# Patient Record
Sex: MALE | Race: WHITE
[De-identification: names, ages, dates, MRNs, and addresses within clinical notes are randomized per-mention and may not be internally consistent; named-entity substitution may affect disease eponyms.]

---

## 2017-12-05 ENCOUNTER — HOSPITAL ENCOUNTER (EMERGENCY)
Dept: HOSPITAL 62 - ER | Age: 32
Discharge: HOME | End: 2017-12-05
Payer: OTHER GOVERNMENT

## 2017-12-05 VITALS — SYSTOLIC BLOOD PRESSURE: 147 MMHG | DIASTOLIC BLOOD PRESSURE: 97 MMHG

## 2017-12-05 DIAGNOSIS — R06.02: ICD-10-CM

## 2017-12-05 DIAGNOSIS — R61: ICD-10-CM

## 2017-12-05 DIAGNOSIS — R07.9: Primary | ICD-10-CM

## 2017-12-05 LAB
ALBUMIN SERPL-MCNC: 5 G/DL (ref 3.5–5)
ALP SERPL-CCNC: 71 U/L (ref 38–126)
ALT SERPL-CCNC: 51 U/L (ref 21–72)
ANION GAP SERPL CALC-SCNC: 15 MMOL/L (ref 5–19)
AST SERPL-CCNC: 27 U/L (ref 17–59)
BASOPHILS # BLD AUTO: 0.1 10^3/UL (ref 0–0.2)
BASOPHILS NFR BLD AUTO: 0.7 % (ref 0–2)
BILIRUB DIRECT SERPL-MCNC: 0.3 MG/DL (ref 0–0.4)
BILIRUB SERPL-MCNC: 0.5 MG/DL (ref 0.2–1.3)
BUN SERPL-MCNC: 15 MG/DL (ref 7–20)
CALCIUM: 9.5 MG/DL (ref 8.4–10.2)
CHLORIDE SERPL-SCNC: 103 MMOL/L (ref 98–107)
CO2 SERPL-SCNC: 28 MMOL/L (ref 22–30)
CREAT SERPL-MCNC: 1.19 MG/DL (ref 0.52–1.25)
EOSINOPHIL # BLD AUTO: 0.5 10^3/UL (ref 0–0.6)
EOSINOPHIL NFR BLD AUTO: 4.7 % (ref 0–6)
ERYTHROCYTE [DISTWIDTH] IN BLOOD BY AUTOMATED COUNT: 13.4 % (ref 11.5–14)
GLUCOSE SERPL-MCNC: 80 MG/DL (ref 75–110)
HCT VFR BLD CALC: 47.4 % (ref 37.9–51)
HGB BLD-MCNC: 16.2 G/DL (ref 13.5–17)
HGB HCT DIFFERENCE: 1.2
LYMPHOCYTES # BLD AUTO: 4.1 10^3/UL (ref 0.5–4.7)
LYMPHOCYTES NFR BLD AUTO: 37.8 % (ref 13–45)
MCH RBC QN AUTO: 29.4 PG (ref 27–33.4)
MCHC RBC AUTO-ENTMCNC: 34.3 G/DL (ref 32–36)
MCV RBC AUTO: 86 FL (ref 80–97)
MONOCYTES # BLD AUTO: 0.6 10^3/UL (ref 0.1–1.4)
MONOCYTES NFR BLD AUTO: 5.4 % (ref 3–13)
NEUTROPHILS # BLD AUTO: 5.5 10^3/UL (ref 1.7–8.2)
NEUTS SEG NFR BLD AUTO: 51.4 % (ref 42–78)
POTASSIUM SERPL-SCNC: 4.3 MMOL/L (ref 3.6–5)
PROT SERPL-MCNC: 7.8 G/DL (ref 6.3–8.2)
RBC # BLD AUTO: 5.52 10^6/UL (ref 4.35–5.55)
SODIUM SERPL-SCNC: 146.2 MMOL/L (ref 137–145)
WBC # BLD AUTO: 10.8 10^3/UL (ref 4–10.5)

## 2017-12-05 PROCEDURE — 93010 ELECTROCARDIOGRAM REPORT: CPT

## 2017-12-05 PROCEDURE — 99285 EMERGENCY DEPT VISIT HI MDM: CPT

## 2017-12-05 PROCEDURE — 84484 ASSAY OF TROPONIN QUANT: CPT

## 2017-12-05 PROCEDURE — 93005 ELECTROCARDIOGRAM TRACING: CPT

## 2017-12-05 PROCEDURE — 36415 COLL VENOUS BLD VENIPUNCTURE: CPT

## 2017-12-05 PROCEDURE — 71020: CPT

## 2017-12-05 PROCEDURE — 80053 COMPREHEN METABOLIC PANEL: CPT

## 2017-12-05 PROCEDURE — 85025 COMPLETE CBC W/AUTO DIFF WBC: CPT

## 2017-12-05 NOTE — ER DOCUMENT REPORT
ED Medical Screen (RME)





- General


Chief Complaint: Chest Pain > 30


Stated Complaint: CHEST PAIN


Time Seen by Provider: 12/05/17 15:42


Notes: 





cp for two days, right sided.


TRAVEL OUTSIDE OF THE [place] IN LAST 30 DAYS: No





- Related Data


Allergies/Adverse Reactions: 


 





No Known Allergies Allergy (Unverified 12/05/17 14:58)


 











Past Medical History





- Social History


Chew tobacco use (# tins/day): No


Frequency of alcohol use: Occasional


Drug Abuse: None


Renal/ Medical History: Denies: Hx Peritoneal Dialysis





Physical Exam





- Vital signs


Vitals: 





 











Temp Pulse Resp BP Pulse Ox


 


 98.7 F   89   20   142/77 H  97 


 


 12/05/17 14:41  12/05/17 14:41  12/05/17 14:41  12/05/17 14:41  12/05/17 14:41














Course





- Vital Signs


Vital signs: 





 











Temp Pulse Resp BP Pulse Ox


 


 98.7 F   89   20   142/77 H  97 


 


 12/05/17 14:41  12/05/17 14:41  12/05/17 14:41  12/05/17 14:41  12/05/17 14:41

## 2017-12-05 NOTE — ER DOCUMENT REPORT
ED Cardiac





- General


Chief Complaint: Chest Pain > 30


Stated Complaint: CHEST PAIN


Time Seen by Provider: 12/05/17 15:42


Mode of Arrival: Ambulatory


Information source: Patient


Notes: 





Patient complains of right-sided chest pain that he states that approximately 3 

days ago.  He states it is been intermittent.  He does not know of anything 

that makes it better or worse.  It does occasionally radiate from the right 

chest to his right arm.  He has had some mild shortness of breath and sweating.

  No nausea vomiting or diarrhea.  No fevers or trauma.  No previous history of 

cardiac disease.  No history of any type of cardiac evaluation.  He denies any 

family history.  He does not smoke or have any chronic medical conditions.  The 

pain is been moderate.  Is been an aching sensation.


TRAVEL OUTSIDE OF THE [place] IN LAST 30 DAYS: No





- Related Data


Allergies/Adverse Reactions: 


 





No Known Allergies Allergy (Unverified 12/05/17 14:58)


 











Past Medical History





- General


Information source: Patient





- Social History


Smoking Status: Never Smoker


Chew tobacco use (# tins/day): No


Frequency of alcohol use: Occasional


Drug Abuse: None


Family History: None


Patient has suicidal ideation: No


Patient has homicidal ideation: No


Renal/ Medical History: Denies: Hx Peritoneal Dialysis





Review of Systems





- Review of Systems


Constitutional: denies: Chills, Fever


Cardiovascular: Chest pain.  denies: Palpitations


Respiratory: Short of breath.  denies: Cough


Gastrointestinal: denies: Diarrhea, Vomiting


-: Yes All other systems reviewed and negative





Physical Exam





- Vital signs


Vitals: 





 











Temp Pulse Resp BP Pulse Ox


 


 98.7 F   89   20   142/77 H  97 


 


 12/05/17 14:41  12/05/17 14:41  12/05/17 14:41  12/05/17 14:41  12/05/17 14:41











Interpretation: Normal





- General


General appearance: Appears well, Alert





- HEENT


Head: Normocephalic, Atraumatic


Eyes: Normal


Pupils: PERRL





- Respiratory


Respiratory status: No respiratory distress


Chest status: Nontender


Breath sounds: Normal


Chest palpation: Normal





- Cardiovascular


Rhythm: Regular


Heart sounds: Normal auscultation


Murmur: No





- Abdominal


Inspection: Normal


Distension: No distension


Bowel sounds: Normal


Tenderness: Nontender


Organomegaly: No organomegaly





- Back


Back: Normal, Nontender





- Extremities


General upper extremity: Normal inspection, Nontender, Normal color, Normal ROM

, Normal temperature


General lower extremity: Normal inspection, Nontender, Normal color, Normal ROM

, Normal temperature, Normal weight bearing.  No: Romelia's sign





- Neurological


Neuro grossly intact: Yes


Cognition: Normal


Orientation: AAOx4


[place] Coma Scale Eye Opening: Spontaneous


[place] Coma Scale Verbal: Oriented


[place] Coma Scale Motor: Obeys Commands


[place] Coma Scale Total: 15


Speech: Normal


Motor strength normal: LUE, RUE, LLE, RLE


Sensory: Normal





- Psychological


Associated symptoms: Normal affect, Normal mood





- Skin


Skin Temperature: Warm


Skin Moisture: Dry


Skin Color: Normal





Course





- Vital Signs


Vital signs: 





 











Temp Pulse Resp BP Pulse Ox


 


 98.7 F   89   20   142/77 H  97 


 


 12/05/17 14:41  12/05/17 14:41  12/05/17 14:41  12/05/17 14:41  12/05/17 14:41














- Laboratory


Result Diagrams: 


 12/05/17 15:52





 12/05/17 15:52


Laboratory results interpreted by me: 





 











  12/05/17 12/05/17





  15:52 15:52


 


WBC  10.8 H 


 


Sodium   146.2 H














- Diagnostic Test


Radiology reviewed: Image reviewed, Reports reviewed - Chest x-ray shows no 

evidence of infiltrate or edema





- EKG Interpretation by Me


EKG shows normal: Sinus rhythm


Rate: Normal


Rhythm: NSR


Axis/QRS: No: Right axis deviation, Left axis deviation





Discharge





- Discharge


Clinical Impression: 


Chest pain


Qualifiers:


 Chest pain type: unspecified Qualified Code(s): R07.9 - Chest pain, unspecified





Condition: Stable


Disposition: HOME, SELF-CARE


Instructions:  Chest Pain of Unclear Cause ([facility])


Additional Instructions: 


Your blood pressure is elevated.  Please have this checked within 1 week by 

your doctor.


Forms:  Elevated Blood Pressure, Return to Work


Referrals: 


MIMI YANEZ MD [COMMUNITY BASED STAFF] - Follow up in 1 week

## 2017-12-05 NOTE — RADIOLOGY REPORT (SQ)
EXAM DESCRIPTION:  CHEST PA/LAT



COMPLETED DATE/TIME:  12/5/2017 4:12 pm



REASON FOR STUDY:  cp



COMPARISON:  None.



NUMBER OF VIEWS:  Two view.



TECHNIQUE:  Frontal and lateral radiographic views of the chest acquired.



LIMITATIONS:  None.



FINDINGS:  LUNGS AND PLEURA:  No opacities, masses or pneumothorax. No pleural effusion.

MEDIASTINUM AND HILAR STRUCTURES:  No masses or contour abnormalities.

HEART AND VASCULATURE:  Heart normal size.  No evidence for failure.

BONY STRUCTURES:  No acute findings.

HARDWARE:  None.

OTHER:  No other significant finding.



IMPRESSION:  NO SIGNIFICANT RADIOGRAPHIC FINDING IN THE CHEST.



TECHNICAL DOCUMENTATION:  JOB ID:  [id number]

 2011 [employer]- All Rights Reserved

## 2018-01-08 ENCOUNTER — HOSPITAL ENCOUNTER (EMERGENCY)
Dept: HOSPITAL 62 - ER | Age: 33
Discharge: HOME | End: 2018-01-08
Payer: OTHER GOVERNMENT

## 2018-01-08 VITALS — SYSTOLIC BLOOD PRESSURE: 143 MMHG | DIASTOLIC BLOOD PRESSURE: 91 MMHG

## 2018-01-08 DIAGNOSIS — R20.0: ICD-10-CM

## 2018-01-08 DIAGNOSIS — S16.1XXA: Primary | ICD-10-CM

## 2018-01-08 DIAGNOSIS — M54.89: ICD-10-CM

## 2018-01-08 DIAGNOSIS — R20.2: ICD-10-CM
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
## 2018-01-08 PROCEDURE — 99283 EMERGENCY DEPT VISIT LOW MDM: CPT

## 2018-01-08 PROCEDURE — 72050 X-RAY EXAM NECK SPINE 4/5VWS: CPT

## 2018-01-08 PROCEDURE — 96374 THER/PROPH/DIAG INJ IV PUSH: CPT

## 2018-01-08 PROCEDURE — 96372 THER/PROPH/DIAG INJ SC/IM: CPT

## 2018-01-08 NOTE — RADIOLOGY REPORT (SQ)
EXAM DESCRIPTION:  CERV SP 4 OR 5 VIEWS



COMPLETED DATE/TIME:  1/8/2018 12:38 pm



REASON FOR STUDY:  neck pain numbness tingling down right arm



COMPARISON:  None.



NUMBER OF VIEWS:  Five views.



TECHNIQUE:  AP, lateral, obliques and odontoid radiographic images acquired of the cervical spine.



LIMITATIONS:  None.



FINDINGS:  MINERALIZATION: Normal.

ALIGNMENT: Anatomic.

VERTEBRAE: Vertebral bodies of normal height.

DISCS: No significant osteophytes or sclerosis. Disc height maintained.

FORAMINA: No osteophytes or foraminal narrowing.

LATERAL AND POSTERIOR ELEMENTS: Facets, lateral masses and spinous processes without significant find
ings.

HARDWARE: None in the spine.

SOFT TISSUES: No masses or calcifications. Lung apices clear.

OTHER: No other significant finding.



IMPRESSION:  NO SIGNIFICANT RADIOGRAPHIC FINDING IN THE CERVICAL SPINE.



TECHNICAL DOCUMENTATION:  JOB ID:  [id number]

 2011 [employer]- All Rights Reserved

## 2018-01-08 NOTE — ER DOCUMENT REPORT
ED Neck/Back Problem





- General


Chief Complaint: Neck and Upper Back Pain


Stated Complaint: NECK/SHOULDER PAIN


Time Seen by Provider: 01/08/18 11:50


Mode of Arrival: Ambulatory


Information source: Patient


Notes: 





32-year-old male presents to ED for complaint of neck and shoulder pain 

radiating down his right arm.  Patient states he came in in December and now 

the pain is worse with numbness and tingling down his right arm.  He has not 

followed up as he was instructed.  He does have a history of sciatica with 

bulging disc at S1.  He is a .  He states he was seen here 

in December for what he felt was chest pain but it was actually the same pain 

he is having now which is pain in the upper back to neck around both sides of 

the neck and down the right arm.


TRAVEL OUTSIDE OF THE [place] IN LAST 30 DAYS: No





- HPI


Patient complains to provider of: Neck, Upper back


Onset: Other - Since December


Onset: Gradual


Timing: Still present


Quality of pain: Sharp


Severity: Severe


Pain Level: 5


Context: Lifting


Recent injury: No


Associated symptoms: Like prior neck/back pain, Numbness/tingling, Radiation to 

arm, Upper back pain.  denies: Constipation, Fever, Incontinence, Motor loss, 

Sensory loss, Sweaty, Unable to urinate, Lower back pain


Exacerbated by: Movement of neck


Relieved by: Nothing


Similar symptoms previously: Yes


Recently seen / treated by doctor: Yes





- Related Data


Allergies/Adverse Reactions: 


 





No Known Allergies Allergy (Verified 01/08/18 10:56)


 











Past Medical History





- General


Information source: Patient





- Social History


Smoking Status: Never Smoker


Cigarette use (# per day): No


Chew tobacco use (# tins/day): No


Smoking Education Provided: No


Frequency of alcohol use: Social - 2 times a week


Drug Abuse: None


Occupation:  at the present


Lives with: Family


Family History: Malignancy.  denies: Arthritis, CAD, COPD, CVA, DM, 

Hyperlipidemia, Hypertension, Thyroid Disfunction


Patient has suicidal ideation: No


Patient has homicidal ideation: No





- Past Medical History


Cardiac Medical History: Reports: None


Pulmonary Medical History: Reports: None


EENT Medical History: Reports: None


Neurological Medical History: Reports: None


Endocrine Medical History: Reports: None


Renal/ Medical History: Reports: None


Malignancy Medical History: Reports None


GI Medical History: Reports: None


Musculoskeltal Medical History: Reports Hx Arthritis, Reports Hx 

Musculoskeletal Deformity


Skin Medical History: Reports None


Psychiatric Medical History: Reports: None


Traumatic Medical History: Reports: None


Infectious Medical History: Reports: None


Surgical Hx: Negative


Past Surgical History: Reports: None





Review of Systems





- Review of Systems


Constitutional: No symptoms reported


EENT: No symptoms reported


Cardiovascular: No symptoms reported


Respiratory: No symptoms reported


Gastrointestinal: No symptoms reported


Genitourinary: No symptoms reported


Male Genitourinary: No symptoms reported


Musculoskeletal: Muscle pain, Muscle stiffness, Neck pain


Skin: No symptoms reported


Hematologic/Lymphatic: No symptoms reported


Neurological/Psychological: No symptoms reported


-: Yes All other systems reviewed and negative





Physical Exam





- Vital signs


Vitals: 


 











Temp Pulse Resp BP Pulse Ox


 


 97.8 F   99   18   147/86 H  96 


 


 01/08/18 10:59  01/08/18 10:59  01/08/18 10:59  01/08/18 10:59  01/08/18 10:59











Interpretation: Normal





- General


General appearance: Appears well, Alert





- HEENT


Head: Normocephalic, Atraumatic


Eyes: Normal


Pupils: PERRL





- Respiratory


Respiratory status: No respiratory distress


Chest status: Nontender


Breath sounds: Normal


Chest palpation: Normal





- Cardiovascular


Rhythm: Regular


Heart sounds: Normal auscultation


Murmur: No





- Abdominal


Inspection: Normal


Distension: No distension


Bowel sounds: Normal


Tenderness: Nontender


Organomegaly: No organomegaly





- Back


Back: Normal, Tender, Vertebra tenderness.  No: Deformity/step-off, CVA 

tenderness, Scars, Scoliosis, Wounds





- Extremities


General upper extremity: Normal inspection, Nontender, Normal color, Normal ROM

, Normal temperature


General lower extremity: Normal inspection, Nontender, Normal color, Normal ROM

, Normal temperature, Normal weight bearing.  No: Romelia's sign





- Neurological


Neuro grossly intact: Yes


Cognition: Normal


Orientation: AAOx4


[place] Coma Scale Eye Opening: Spontaneous


[place] Coma Scale Verbal: Oriented


[place] Coma Scale Motor: Obeys Commands


[place] Coma Scale Total: 15


Speech: Normal


Motor strength normal: LUE, RUE, LLE, RLE


Sensory: Normal





- Psychological


Associated symptoms: Normal affect, Normal mood





- Skin


Skin Temperature: Warm


Skin Moisture: Dry


Skin Color: Normal





Course





- Re-evaluation


Re-evalutation: 





01/08/18 21:27


Patient has full range of motion to both arms and shoulders.  There is no 

increase in pain with range of motion of the arms and shoulders.  He has equal 

strength in bilateral arms.  His x-ray of the cervical spine was discussed with 

patient and a written report given to patient with instructions to follow-up 

with a primary doctor and with a spine specialist.  Patient was treated with 

Toradol Decadron and Lidoderm for his pain.





- Vital Signs


Vital signs: 


 











Temp Pulse Resp BP Pulse Ox


 


 98.7 F   86   16   143/91 H  97 


 


 01/08/18 13:27  01/08/18 13:27  01/08/18 13:27  01/08/18 13:27  01/08/18 13:27














Discharge





- Discharge


Clinical Impression: 


 Upper back pain





Cervical strain


Qualifiers:


 Encounter type: initial encounter Qualified Code(s): S16.1XXA - Strain of 

muscle, fascia and tendon at neck level, initial encounter





Condition: Stable


Disposition: HOME, SELF-CARE


Instructions:  Family Physicians / Practices


Additional Instructions: 


NECK INJURY (CERVICAL STRAIN):


     You have a neck strain.  This is an injury to the muscles and ligaments in 

the neck.  There is no evidence of a fracture of the neck bones.  Also, no 

injury to the spinal cord or nerve roots was detected.


     Usually, stiffness and pain INCREASE for the first 24-48 hours after the 

injury.  The pain will gradually resolve and the neck will become more mobile.  

Most patients are back at work or school within a few days.  Typically, 

complete healing takes about two or three weeks.


     The usual initial treatment is rest and cold packs.  A neck collar may be 

placed to keep the muscles of the neck at rest. Antiinflammatory and muscle 

relaxing medication are often used to reduce the spasm and irritation.


     You should call the doctor, or go to the hospital, if you develop numbness 

or weakness in any extremity, problems with your bladder or bowel, or pain 

radiating down the arms.








MUSCLE STRAIN:


     You have strained a muscle -- torn the fibers within the muscle. This 

often occurs with strenuous exertion, or during an injury that suddenly 

stretches the muscle.  The seriousness of a strain varies. Some strains heal 

within days, others cause problems for months.


     X-rays cannot show a muscle strain.  X-rays are taken only if symptoms 

suggest that a fracture could be present.


     The usual treatment of a muscle strain is rest and ice packs. Sometimes, a 

sling, splint, or crutches may be necessary to rest the muscle.  The muscle can 

be used again once pain subsides.  Severe strains require a special exercise 

and stretching program to prevent permanent stiffness and disability.  Your 

doctor will advise you if this will be necessary.


     Call the doctor immediately if pain or swelling becomes severe, or if 

numbness or discoloration develop.








USE OF TYLENOL (ACETAMINOPHEN):


     Acetaminophen may be taken for pain relief or fever control. It's much 

safer than aspirin, offering a wider range of "safe" dosages.  It is safe 

during pregnancy.  Some brand names are Tylenol, Panadol, Datril, Anacin 3, 

Tempra, and Liquiprin. Acetaminophen can be repeated every four hours.  The 

following are maximum recommended dosages:





WEIGHT         Dose             Drops                  Elixir        Chewable(

80mg)


(LBS.)                            drprs=droppers    tsp=teaspoon


6               40 mg            0.4 ml (1/2)


6-11            80 mg            0.8 ml (full)              tsp               

   1       tab


12-16         120 mg           1 1/2 drprs             3/4  tsp               1 

1/2  tabs


17-23         160 mg             2  drprs             1    tsp                 

  2       tabs


24-30         240 mg             3  drprs             1 1/2 tsp                

3       tabs


30-35         320 mg                                       2    tsp            

       4       tabs


36-41         360 mg                                       2 1/4   tsp         

     4 1/2 tabs


42-47         400 mg                                       2 1/2   tsp         

     5      tabs


48-53         480 mg                                       3    tsp            

       6      tabs


54-59         520 mg                                       3  1/4  tsp         

     6 1/2 tabs


60-64         560 mg                                       3  1/2  tsp         

     7      tabs 


65-70         600 mg                                       3  3/4  tsp         

     7 1/2 tabs


71-76         640 mg                                       4   tsp             

      8      tabs


77-82         720 mg                                       4 1/2   tsp         

    9      tabs


83-88         800 mg                                       5   tsp             

    10      tabs





>89 pounds or adults          650 mg to 900 mg





Acetaminophen can be repeated every four hours.  Maximum dose not to exceed 

4000 mg a day.





   These maximum recommended dosages are slightly higher than the dosages 

written on the product container, but these dosages are very safe and below the 

toxic dosage for acetaminophen.








ICE PACKS:


     Apply ice packs frequently against the painful area.  Many different 

schedules are recommended, such as "20 minutes on, 20 minutes off" or "one hour 

ice, two hours rest."  If you need to work, you may need to go longer between 

ice treatments.  You should plan to have the area ice packed AT LEAST one 

fourth of the time.


     The ice should be applied over the wrap, tape, or splint, or over a layer 

of cloth -- not directly against the skin.  Some ice bags have a built-in cloth 

and can be put directly on the skin.





WARM PACKS:


     After approximately two days, apply gentle heat (such as a heating pad or 

hot water bottle) for about 20 to 30 minutes about every two hours -- at least 

four times daily.  Warmth and elevation will help you make a more rapid recovery

, and will ease the pain considerably.


     Do not use HOT heat, and never apply heat for longer than 30 minutes.  The 

continuous heat can invisibly damage skin and muscles -- even when no burn is 

seen on the surface.  Damaged muscles can make you MORE sore.








MUSCLE RELAXERS: 


     Muscle relaxing medications are usually prescribed for acute muscle spasm 

or injury to the neck and back.  They are often combined with antiinflammatory 

pain medication for increased relief.


     You may stop the muscle relaxer when the pain and stiffness have improved.

  Start the medication again if spasms recur.  


     Muscle relaxers may cause drowsiness, especially with the first dose.  Do 

not operate machinery or drive while under the effects of the medication.  Most 

muscle relaxers last up to 24 hours.  Do not combine the medication with 

alcohol.





Ibuprofen





     Ibuprofen is an excellent, safe drug for pain control.  In addition, it 

has potent antiinflammatory effects which are beneficial, especially in the 

treatment of injuries, arthritis, or tendonitis. It's best to take ibuprofen 

with food.  Persons with ulcer disease or allergy to aspirin should notify 

their physician of this before taking ibuprofen.


     Take the medication exactly as prescribed.  Don't take additional doses 

unless instructed to do so by your doctor.  If you develop wheezing, shortness 

of breath, hives, faintness, stomach pain, vomiting, or dark black stools, 

return for re-evaluation at once.





Exercise Program for the Shoulder





     Since the shoulder moves in so many directions, the joint attachment is 

weak.  Muscles provide most of the stability to the shoulder.  You must 

exercise your shoulder to prevent painful instability or stiffening.


     PASSIVE - These may be begun within a few days of the injury. While 

standing, lean forward, allowing the arm to hang down towards the floor.  Move 

the arm in small circles while slowly twisting your chest towards and away from 

the hanging arm.  Do this for one minute.


     ACTIVE - These may be performed when the doctor gives permission. Begin 

with the arms at the sides.  Raise the arms forward (shoulder's width apart) 

until they reach shoulder level.  Then slowly swing both arms back until they 

are aiming straight out away from each other. Then bring them forward again, 

and finally, lower them to your sides. Repeat 20 to 30 times.  As you improve, 

put weights in your hands for the exercise.  Start with one pound, and work up 

to 10 pounds.  Never use more than is comfortable.  Athletes may work up to 30 

pounds.





FOLLOW-UP CARE:


If you have been referred to a physician for follow-up care, call the physician

s office for an appointment as you were instructed or within the next two days.

  If you experience worsening or a significant change in your symptoms, notify 

the physician immediately or return to the Emergency Department at any time for 

re-evaluation.


Prescriptions: 


Ibuprofen 800 mg PO Q8HP PRN #20 tablet


 PRN Reason: 


Cyclobenzaprine HCl [Flexeril 10 mg Tablet] 10 mg PO TIDP PRN #15 tab


 PRN Reason: 


Lidocaine [Lidoderm 5% (700 mg) Transdermal Patch] 1 patch TP DAILY #20 

adh..patch


Forms:  Elevated Blood Pressure, Return to Work


Referrals: 


RICHARDSON SYLVESTER MD [ASSOCIATE] - Follow up as needed

## 2018-01-14 ENCOUNTER — HOSPITAL ENCOUNTER (EMERGENCY)
Dept: HOSPITAL 62 - ER | Age: 33
Discharge: HOME | End: 2018-01-14
Payer: OTHER GOVERNMENT

## 2018-01-14 VITALS — SYSTOLIC BLOOD PRESSURE: 138 MMHG | DIASTOLIC BLOOD PRESSURE: 89 MMHG

## 2018-01-14 DIAGNOSIS — Z87.891: ICD-10-CM
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
## 2018-01-14 DIAGNOSIS — S16.1XXA: Primary | ICD-10-CM

## 2018-01-14 DIAGNOSIS — M25.519: ICD-10-CM

## 2018-01-14 DIAGNOSIS — I10: ICD-10-CM

## 2018-01-14 PROCEDURE — 99283 EMERGENCY DEPT VISIT LOW MDM: CPT

## 2018-01-14 NOTE — ER DOCUMENT REPORT
HPI





- HPI


Pain Level: 5





Past Medical History





- Social History


Family History: Malignancy.  denies: Arthritis, CAD, COPD, CVA, DM, 

Hyperlipidemia, Hypertension, Thyroid Disfunction


Renal/ Medical History: Denies: Hx Peritoneal Dialysis


Musculoskeltal Medical History: Reports Hx Arthritis, Reports Hx 

Musculoskeletal Deformity





Vertical Provider Document





- INFECTION CONTROL


TRAVEL OUTSIDE OF THE [place] IN LAST 30 DAYS: No





- RESPIRATORY


O2 Sat by Pulse Oximetry: 97





Course





- Vital Signs


Vital signs: 


 











Temp Pulse Resp BP Pulse Ox


 


 97.8 F   97   18   138/89 H  97 


 


 01/14/18 08:38  01/14/18 08:38  01/14/18 08:38  01/14/18 08:38  01/14/18 08:38

## 2018-01-14 NOTE — ER DOCUMENT REPORT
HPI





- HPI


Patient complains to provider of: neck pain


Onset: Other - Chronic


Onset/Duration: Intermittent


Severity: Moderate


Pain Level: 3


Context: 





32-year-old male presented to ED for complaint of neck and shoulder pain is 

been ongoing from an old injury.  He states that he needs a work note so that 

he can go back to work on Monday because his doctor's office was closed for the 

weekend.  He states he has a scheduled appointment on Wednesday with the back 

specialist for his neck and shoulder pain


Associated Symptoms: Other - Chronic neck and shoulder pain


Exacerbated by: Movement


Relieved by: Denies


Similar symptoms previously: Yes


Recently seen / treated by doctor: Yes





- ROS


ROS below otherwise negative: Yes





- CONSTITUTIONAL


Constitutional: DENIES: Fever, Chills





- EENT


EENT: DENIES: Sore Throat, Ear Pain, Nasal Drainage-Clear, Nasal Drainage-

Purulent, Congestion, Eye problems





- NEURO


Neurology: DENIES: Headache, Weakness, Vision blurred, Dizzinesss / Vertigo





- CARDIOVASCULAR


Cardiovascular: DENIES: Chest pain





- RESPIRATORY


Respiratory: DENIES: Trouble Breathing, Coughing





- GASTROINTESTINAL


Gastrointestinal: DENIES: Abdominal Pain, Nausea, Patient vomiting, Diarrhea, 

Constipation, Black / Bloody Stools





- URINARY


Urinary: DENIES: Dysuria, Urgency, Frequency





- REPRODUCTIVE


Reproductive: DENIES: Pregnant:, Postmenopausal, Abnormal bleeding / discharge





- MUSCULOSKELETAL


Musculoskeletal: REPORTS: Back Pain, Neck Pain.  DENIES: Extremity pain, 

Swelling





- DERM


Skin Color: Normal


Skin Problems: None





Past Medical History





- General


Information source: Patient





- Social History


Smoking Status: Former Smoker


Cigarette use (# per day): No


Chew tobacco use (# tins/day): No


Smoking Education Provided: No


Frequency of alcohol use: Occasional


Drug Abuse: None


Occupation: 


Lives with: Family


Family History: Malignancy.  denies: Arthritis, CAD, COPD, CVA, DM, 

Hyperlipidemia, Hypertension, Thyroid Disfunction


Patient has suicidal ideation: No


Patient has homicidal ideation: No





- Past Medical History


Cardiac Medical History: Reports: None


Pulmonary Medical History: Reports: None


EENT Medical History: Reports: None


Neurological Medical History: Reports: None


Endocrine Medical History: Reports: None


Renal/ Medical History: Reports: None


Malignancy Medical History: Reports None


GI Medical History: Reports: None


Musculoskeltal Medical History: Reports Hx Arthritis, Reports Hx 

Musculoskeletal Deformity


Skin Medical History: Reports None


Psychiatric Medical History: Reports: None


Traumatic Medical History: Reports: None


Infectious Medical History: Reports: None


Surgical Hx: Negative


Past Surgical History: Reports: None





Vertical Provider Document





- CONSTITUTIONAL


Agree With Documented VS: Yes


Exam Limitations: No Limitations


General Appearance: WD/WN, No Apparent Distress





- INFECTION CONTROL


TRAVEL OUTSIDE OF THE [place] IN LAST 30 DAYS: No





- HEENT


HEENT: Atraumatic, Normal ENT Exam, Normocephalic, PERRLA





- NECK


Neck: Normal Inspection, Supple, Thyroid Normal





- RESPIRATORY


Respiratory: Breath Sounds Normal, No Respiratory Distress, Chest Non-Tender


O2 Sat by Pulse Oximetry: 97





- CARDIOVASCULAR


Cardiovascular: Regular Rate, Regular Rhythm





- BACK


Back: negative: Normal Inspection - Patient complains of pain to the neck and 

shoulder upper back, CVA Tenderness-Right, CVA Tenderness-Left





- MUSCULOSKELETAL/EXTREMETIES


Musculoskeletal/Extremeties: Tender - Neck and upper back





- NEURO


Level of Consciousness: Awake, Alert, Appropriate


Motor/Sensory: No Motor Deficit, No Sensory Deficit





- DERM


Integumentary: Warm, Dry, No Rash





Course





- Re-evaluation


Re-evalutation: 





01/14/18 21:25


Patient has no change in condition since 1/8/2018 he has an appointment on 

Wednesday with the back specialist he stated he mostly just needed a work note 

for today.





- Vital Signs


Vital signs: 


 











Temp Pulse Resp BP Pulse Ox


 


 97.8 F   97   18   138/89 H  97 


 


 01/14/18 08:38  01/14/18 08:38  01/14/18 08:38  01/14/18 08:38  01/14/18 08:54














Discharge





- Discharge


Clinical Impression: 


 Upper back pain





Cervical strain


Qualifiers:


 Encounter type: initial encounter Qualified Code(s): S16.1XXA - Strain of 

muscle, fascia and tendon at neck level, initial encounter





HTN (hypertension)


Qualifiers:


 Hypertension type: unspecified Qualified Code(s): I10 - Essential (primary) 

hypertension





Condition: Stable


Disposition: HOME, SELF-CARE


Instructions:  Exercise Program for the Shoulder ([facility])


Additional Instructions: 


NECK INJURY (CERVICAL STRAIN):


     You have a neck strain.  This is an injury to the muscles and ligaments in 

the neck.  There is no evidence of a fracture of the neck bones.  Also, no 

injury to the spinal cord or nerve roots was detected.


     Usually, stiffness and pain INCREASE for the first 24-48 hours after the 

injury.  The pain will gradually resolve and the neck will become more mobile.  

Most patients are back at work or school within a few days.  Typically, 

complete healing takes about two or three weeks.


     The usual initial treatment is rest and cold packs.  A neck collar may be 

placed to keep the muscles of the neck at rest. Antiinflammatory and muscle 

relaxing medication are often used to reduce the spasm and irritation.


     You should call the doctor, or go to the hospital, if you develop numbness 

or weakness in any extremity, problems with your bladder or bowel, or pain 

radiating down the arms.








MUSCLE STRAIN:


     You have strained a muscle -- torn the fibers within the muscle. This 

often occurs with strenuous exertion, or during an injury that suddenly 

stretches the muscle.  The seriousness of a strain varies. Some strains heal 

within days, others cause problems for months.


     X-rays cannot show a muscle strain.  X-rays are taken only if symptoms 

suggest that a fracture could be present.


     The usual treatment of a muscle strain is rest and ice packs. Sometimes, a 

sling, splint, or crutches may be necessary to rest the muscle.  The muscle can 

be used again once pain subsides.  Severe strains require a special exercise 

and stretching program to prevent permanent stiffness and disability.  Your 

doctor will advise you if this will be necessary.


     Call the doctor immediately if pain or swelling becomes severe, or if 

numbness or discoloration develop.








USE OF TYLENOL (ACETAMINOPHEN):


     Acetaminophen may be taken for pain relief or fever control. It's much 

safer than aspirin, offering a wider range of "safe" dosages.  It is safe 

during pregnancy.  Some brand names are Tylenol, Panadol, Datril, Anacin 3, 

Tempra, and Liquiprin. Acetaminophen can be repeated every four hours.  The 

following are maximum recommended dosages:





WEIGHT         Dose             Drops                  Elixir        Chewable(

80mg)


(LBS.)                            drprs=droppers    tsp=teaspoon


6               40 mg            0.4 ml (1/2)


6-11            80 mg            0.8 ml (full)              tsp               

   1       tab


12-16         120 mg           1 1/2 drprs             3/4  tsp               1 

1/2  tabs


17-23         160 mg             2  drprs             1    tsp                 

  2       tabs


24-30         240 mg             3  drprs             1 1/2 tsp                

3       tabs


30-35         320 mg                                       2    tsp            

       4       tabs


36-41         360 mg                                       2 1/4   tsp         

     4 1/2 tabs


42-47         400 mg                                       2 1/2   tsp         

     5      tabs


48-53         480 mg                                       3    tsp            

       6      tabs


54-59         520 mg                                       3  1/4  tsp         

     6 1/2 tabs


60-64         560 mg                                       3  1/2  tsp         

     7      tabs 


65-70         600 mg                                       3  3/4  tsp         

     7 1/2 tabs


71-76         640 mg                                       4   tsp             

      8      tabs


77-82         720 mg                                       4 1/2   tsp         

    9      tabs


83-88         800 mg                                       5   tsp             

    10      tabs





>89 pounds or adults          650 mg to 900 mg





Acetaminophen can be repeated every four hours.  Maximum dose not to exceed 

4000 mg a day.





   These maximum recommended dosages are slightly higher than the dosages 

written on the product container, but these dosages are very safe and below the 

toxic dosage for acetaminophen.











ICE PACKS:


     Apply ice packs frequently against the painful area.  Many different 

schedules are recommended, such as "20 minutes on, 20 minutes off" or "one hour 

ice, two hours rest."  If you need to work, you may need to go longer between 

ice treatments.  You should plan to have the area ice packed AT LEAST one 

fourth of the time.


     The ice should be applied over the wrap, tape, or splint, or over a layer 

of cloth -- not directly against the skin.  Some ice bags have a built-in cloth 

and can be put directly on the skin.





WARM PACKS:


     After approximately two days, apply gentle heat (such as a heating pad or 

hot water bottle) for about 20 to 30 minutes about every two hours -- at least 

four times daily.  Warmth and elevation will help you make a more rapid recovery

, and will ease the pain considerably.


     Do not use HOT heat, and never apply heat for longer than 30 minutes.  The 

continuous heat can invisibly damage skin and muscles -- even when no burn is 

seen on the surface.  Damaged muscles can make you MORE sore.











FOLLOW-UP CARE:


If you have been referred to a physician for follow-up care, call the physician

s office for an appointment as you were instructed or within the next two days.

  If you experience worsening or a significant change in your symptoms, notify 

the physician immediately or return to the Emergency Department at any time for 

re-evaluation.


Forms:  Elevated Blood Pressure, Return to Work


Referrals: 


RICHARDSON SYLVESTER MD [ASSOCIATE] - Follow up as needed